# Patient Record
Sex: FEMALE | Race: ASIAN
[De-identification: names, ages, dates, MRNs, and addresses within clinical notes are randomized per-mention and may not be internally consistent; named-entity substitution may affect disease eponyms.]

---

## 2023-08-21 PROBLEM — Z00.00 ENCOUNTER FOR PREVENTIVE HEALTH EXAMINATION: Status: ACTIVE | Noted: 2023-08-21

## 2023-08-22 ENCOUNTER — APPOINTMENT (OUTPATIENT)
Dept: ORTHOPEDIC SURGERY | Facility: CLINIC | Age: 83
End: 2023-08-22
Payer: MEDICARE

## 2023-08-22 VITALS
SYSTOLIC BLOOD PRESSURE: 139 MMHG | WEIGHT: 125 LBS | BODY MASS INDEX: 20.09 KG/M2 | OXYGEN SATURATION: 98 % | HEIGHT: 66 IN | HEART RATE: 68 BPM | DIASTOLIC BLOOD PRESSURE: 77 MMHG

## 2023-08-22 PROCEDURE — 99204 OFFICE O/P NEW MOD 45 MIN: CPT

## 2023-08-22 RX ORDER — NAPROXEN 250 MG/1
250 TABLET ORAL
Refills: 0 | Status: ACTIVE | COMMUNITY

## 2023-08-22 RX ORDER — FERROUS GLUCONATE 256(28)MG
325 TABLET ORAL
Refills: 0 | Status: ACTIVE | COMMUNITY

## 2023-08-22 RX ORDER — ZOLPIDEM TARTRATE 5 MG/1
TABLET, FILM COATED ORAL
Refills: 0 | Status: ACTIVE | COMMUNITY

## 2023-08-22 RX ORDER — BACLOFEN 10 MG/1
10 TABLET ORAL
Refills: 0 | Status: ACTIVE | COMMUNITY

## 2023-08-22 RX ORDER — ALUMINUM HYDROXIDE AND MAGNESIUM CARBONATE 95; 358 MG/15ML; MG/15ML
LIQUID ORAL
Refills: 0 | Status: ACTIVE | COMMUNITY

## 2023-08-22 RX ORDER — ACYCLOVIR 800 MG/1
800 TABLET ORAL
Refills: 0 | Status: ACTIVE | COMMUNITY

## 2023-08-22 RX ORDER — DOCUSATE SODIUM AND SENNOSIDES 50; 8.6 MG/1; MG/1
8.6-5 TABLET, FILM COATED ORAL
Refills: 0 | Status: ACTIVE | COMMUNITY

## 2023-08-22 RX ORDER — FLUTICASONE PROPIONATE 50 UG/1
50 SPRAY, METERED NASAL
Refills: 0 | Status: ACTIVE | COMMUNITY

## 2023-08-22 RX ORDER — OMEPRAZOLE 40 MG/1
CAPSULE, DELAYED RELEASE ORAL
Refills: 0 | Status: ACTIVE | COMMUNITY

## 2023-08-22 RX ORDER — MULTIVIT-MIN/FOLIC/VIT K/LYCOP 400-300MCG
1000 TABLET ORAL
Refills: 0 | Status: ACTIVE | COMMUNITY

## 2023-08-22 RX ORDER — PANCRELIPASE 36000; 180000; 114000 [USP'U]/1; [USP'U]/1; [USP'U]/1
CAPSULE, DELAYED RELEASE PELLETS ORAL
Refills: 0 | Status: ACTIVE | COMMUNITY

## 2023-08-22 RX ORDER — PSYLLIUM HUSK 0.4 G
CAPSULE ORAL
Refills: 0 | Status: ACTIVE | COMMUNITY

## 2023-08-22 RX ORDER — CANDESARTAN CILEXETIL 16 MG/1
16 TABLET ORAL
Refills: 0 | Status: ACTIVE | COMMUNITY

## 2023-08-22 RX ORDER — MULTIVIT-MIN/IRON/FOLIC ACID/K 18-600-40
400 CAPSULE ORAL
Refills: 0 | Status: ACTIVE | COMMUNITY

## 2023-08-22 RX ORDER — LORATADINE 10 MG
TABLET,DISINTEGRATING ORAL
Refills: 0 | Status: ACTIVE | COMMUNITY

## 2023-08-22 RX ORDER — OXYBUTYNIN CHLORIDE 5 MG/1
5 TABLET ORAL
Refills: 0 | Status: ACTIVE | COMMUNITY

## 2023-08-22 NOTE — HISTORY OF PRESENT ILLNESS
[de-identified] : Initial Visiti 8/22/2023: Ms. Montenegro reports long-standing low back pain that radiates up during mid-back and then posteriorly down her right lower extremity to her foot. Symptoms most severe over the past 2 years. Pain is daily moderately bothersome, but worse with activities like heavy lifting. Patient's most recent exacerbation was in May of 2023 and occurred after several days of moving into her apartment. She takes oral anti-inflammatories with improvement of her symptoms. She's also been in physical therapy for the past 2 weeks as well, per her PCP, Dr. Irizarry. She denies any lower extremity, numbness, weakness, paresthesia. She is not limited in her ability to ambulate due to these symptoms.

## 2023-08-22 NOTE — END OF VISIT
[FreeTextEntry3] :   Documented by Shari Johnston acting as a scribe for Dr. Juliano Hair 8/22/23   All medical record entries made by the Scribe were at my, Dr. Juliano Hari, direction and personally dictated by me on 8/22/23. I have reviewed the chart and agree that the record accurately reflects my personal performance of the history, physical exam, assessment and plan. I have also personally directed, reviewed, and agreed with the chart.

## 2023-08-22 NOTE — PHYSICAL EXAM
[de-identified] : General: patient is well developed, well nourished, in no acute distress, alert and oriented x 3.   Mood and affect: normal   Respiratory: no respiratory distress noted   Skin: no scars over spine, skin intact, no erythema, increased warmth   Alignment: The spine is well compensated in the coronal and sagittal plane.    Gait: The patient is able to toe walk and heel walk without difficulty. The patient is able to tandem gait without difficulty.   Palpation: no tenderness to palpation spine or paraspinal region   Range of motion: Lumbar spine ROM is restricted    Neurologic Exam:   Motor: Manual Muscle testing in the lower extremities is 5 out of 5 in all muscle groups. There is no evidence of muscular atrophy in the lower extremities.  Sensory: Sensation to light touch is grossly intact in the lower extremities   Reflexes: DTR are present and symmetric throughout, no clonus, plantar responses are flexor   Hip Exam: No pain with internal or external rotation of hips bilaterally   Special tests: Straight leg raise test negative.  Cross straight leg test negative.  TORY test negative   Vascular: Examination of the peripheral vascular system demonstrates no evidence of congestion or edema. no evidence of lymphedema bilateral lower extremities, pulses are present and symmetric in both lower extremities. [de-identified] : Xray 8/22/23: mild scoliosis; severe disc degeneration L3/4, L4/5, L5/S1.  some arthritic changes seen in bilateral hips.  no instability or fractures.  normal sagittal alingment for age.  MRI reviewed

## 2023-08-22 NOTE — DISCUSSION/SUMMARY
[de-identified] : Lumbar stenosis; radiculopathy recommend Dr. Shine for ROXANN trial.  Spends half of her time in [place] will try to arrange for before she leaves for . follow up with me when she returns.

## 2023-09-08 ENCOUNTER — APPOINTMENT (OUTPATIENT)
Dept: PAIN MANAGEMENT | Facility: CLINIC | Age: 83
End: 2023-09-08
Payer: MEDICARE

## 2023-09-08 PROCEDURE — 99214 OFFICE O/P EST MOD 30 MIN: CPT | Mod: 25

## 2023-09-08 PROCEDURE — 62320 NJX INTERLAMINAR CRV/THRC: CPT

## 2023-09-08 PROCEDURE — 62322 NJX INTERLAMINAR LMBR/SAC: CPT

## 2023-09-10 NOTE — PHYSICAL EXAM
[Normal muscle bulk without asymmetry] : normal muscle bulk without asymmetry [Within functional limits and without pain] : within functional limits and without pain [Normal] : Gait: normal [Tandem Gait Normal] : tandem gait normal [UE/LE] : Sensory: Intact in bilateral upper & lower extremities [] : Sensory: [Spinous Process Tenderness] : no spinous process tenderness [Paraspinal Tenderness] : no paraspinal tenderness [Ataxic] : not ataxic [Antalgic] : not antalgic [de-identified] : Non-labored breathing [de-identified] : Positive facet loading on the right

## 2023-09-10 NOTE — DATA REVIEWED
[FreeTextEntry1] : Date of Exam: 08- EXAM:  MRI LUMBAR SPINE WITHOUT CONTRAST  - At L3-4, there is moderate to severe canal stenosis, moderate right, mild left foraminal narrowing, and moderate ligamentum flavum hypertrophy (5.5 mm on left and 6.3 mm on right) - At L4-5, there is moderate to severe canal stenosis, mild bilateral foraminal narrowing, and moderate ligamentum flavum hypertrophy (7.7 mm on left and 8.8 mm on right)  - Please refer to radiology report for full impression

## 2023-09-10 NOTE — HISTORY OF PRESENT ILLNESS
[Back Pain] : back pain [___ yrs] : [unfilled] year(s) ago [Episodic] : episodic [3] : a current pain level of 3/10 [0] : a minimum pain level of 0/10 [10] : a maximum pain level of 10/10 [Dull] : dull [Aching] : aching [Sitting] : sitting [Standing] : standing [Laying] : laying [Medications] : medications [Rest] : rest [Other: ___] : [unfilled] [FreeTextEntry1] : Patient is an 82 year old female with Medicare Part B referred by Dr. Hair (Orthopedics) for evaluation of chronic low back pain. Patient reports dealing with low back pain since her 20s but had a severe exacerbation in the summer of 2022 while carrying heavy luggage while on vacation. Patient had to use a cane to ambulate at that time secondary to pain. Patient describes her low back pain as dull and achy and occasionally radiates down the posterior aspect of her right lower extremity into her right foot. Pain is currently rated 2/10, rated 10/10 at its worst, and 0/10 at its best. Patient unable to identify an average pain score. Pain is alleviated by stretching, massage, rest, laying flat on back, and medications. Pain is exacerbated by prolonged sitting, standing, and most physical activities however patient notes that walking does not exacerbate her pain.  Recent imaging includes a lumbar MRI performed one month ago. Patient is currently participating in physical therapy without significant relief. She has failed a period of rest, activity modification, NSAIDs (Naproxen), muscle relaxants (Baclofen), chiropractic care (15 years ago). 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 Patient's pain is also impacting her quality of sleep and overall enjoyment of her activities.  Patient presents today to discuss further treatment options to help manage her pain.

## 2023-09-10 NOTE — REVIEW OF SYSTEMS
[Sight Problems] : vision problems [Back Pain] : back pain [Neck Pain] : neck pain [Joint Stiffness] : joint stiffness [Feeling Weak] : feeling weak [Negative] : Neurological [Decreased ROM] : no decreased range of motion

## 2023-09-10 NOTE — ASSESSMENT
[FreeTextEntry1] : Ms. Montenegro is an 82 year old female with chronic low back pain exacerbated in the summer of 2022 with recent MRI demonstrating spinal stenosis and ligamentum flavum hypertrophy especially at L3-L4 and L4-L5 who presents today to discuss further treatment options after failing conservative therapies. Patient may benefit from a Lumbar ROXANN today and discussed mild procedure with the patient as an option for the future as well. Of note, patient spends 4 months in [place] and 8 months in [place] and is scheduled to travel to  on 09/26/2023.  Plan: -Lumbar Interlaminar ROXANN at L3-L4 today -Patient provided reading material on mild procedure for future consideration given moderate ligamentum flavum hypertrophy at L3-L4 (5.5 mm on left and 6.3 mm on right) and L4-5 (7.7 mm on left and 8.8 mm on right)  -Continue Baclofen and Naproxen as prescribed -Continue Physical Therapy as prescribed by PCP  The potential benefits as well as rare but possible risks were reviewed with the patient.  These risks including infection including epidural abscess, meningitis, osteomyelitis, and discitis, bleeding including epidural hematoma, nerve injury, paralysis, failure to relieve pain or worse pain, headache, pneumothorax, elevated blood sugars, allergic reactions, adverse reactions to medications, vasovagal reactions, falls, etc. Following that discussion, we decided together that the potential benefits outweigh the potential risks and we decided to proceed with scheduling the procedure.  I answered all the patient's questions about the procedure including the technical details of the procedure and also offered that if any other questions arise we will also be happy to answer those on the day of the procedure. All questions today were answered to the patients satisfaction, and the patient stated their verbal understanding.    ================================= Procedure note:  Interlaminar lumbar epidural steroid injection L3-4  After obtaining consent, pre-procedure blood pressure and heart rate were stable and recorded in the nursing record. Standard monitors were applied. The patient was placed in the prone position. The lumbosacral area was widely prepped with chloroprep and draped in sterile fashion. Fluoroscopic guidance was used to identify the desired interlaminar space and for needle placement. Subcutaneous 0.5% lidocaine was used to anesthetize the skin overlying the target. A 20-gauge Tuohy needle was advanced to the epidural space using loss of resistance to air technique and AP / contralateral oblique views under fluoroscopy. There was no evidence of heme or CSF and no paresthesias were elicited with needle placement.   We did NOT use omnipaque contrast as patient expressed prior history of SOB when given contrast previously.  Next 3 ml preservative free normal saline  and 10 mg dexamethasone was administered epidurally with no pain elicited on injection. The needle was removed, skin cleansed with alcohol and a sterile bandage was applied. The patient tolerated the procedure well and no complications were encountered. Following the procedure, the patient's vital signs were stable. The patient was discharged home in good condition with post-procedural instructions.  Time Out: Immediately prior to the procedure, the following was verbally confirmed that there is a consent form and that the correct patient, planned procedure, site and side are consistent with documentation and that necessary equipment and/or blood products are available prior to the start of the case.  Complications: none EBL: <5 cc

## 2023-09-12 ENCOUNTER — EMERGENCY (EMERGENCY)
Facility: HOSPITAL | Age: 83
LOS: 1 days | Discharge: HOME | End: 2023-09-12
Admitting: STUDENT IN AN ORGANIZED HEALTH CARE EDUCATION/TRAINING PROGRAM
Payer: MEDICARE

## 2023-09-12 VITALS
HEART RATE: 67 BPM | WEIGHT: 125 LBS | SYSTOLIC BLOOD PRESSURE: 169 MMHG | DIASTOLIC BLOOD PRESSURE: 71 MMHG | RESPIRATION RATE: 18 BRPM | TEMPERATURE: 98 F | OXYGEN SATURATION: 98 %

## 2023-09-12 DIAGNOSIS — M25.521 PAIN IN RIGHT ELBOW: ICD-10-CM

## 2023-09-12 DIAGNOSIS — Z91.040 LATEX ALLERGY STATUS: ICD-10-CM

## 2023-09-12 DIAGNOSIS — Z87.448 PERSONAL HISTORY OF OTHER DISEASES OF URINARY SYSTEM: ICD-10-CM

## 2023-09-12 DIAGNOSIS — Z91.041 RADIOGRAPHIC DYE ALLERGY STATUS: ICD-10-CM

## 2023-09-12 DIAGNOSIS — M19.90 UNSPECIFIED OSTEOARTHRITIS, UNSPECIFIED SITE: ICD-10-CM

## 2023-09-12 DIAGNOSIS — S50.01XA CONTUSION OF RIGHT ELBOW, INITIAL ENCOUNTER: ICD-10-CM

## 2023-09-12 DIAGNOSIS — Z87.39 PERSONAL HISTORY OF OTHER DISEASES OF THE MUSCULOSKELETAL SYSTEM AND CONNECTIVE TISSUE: ICD-10-CM
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
## 2023-09-12 DIAGNOSIS — S00.83XA CONTUSION OF OTHER PART OF HEAD, INITIAL ENCOUNTER: ICD-10-CM

## 2023-09-12 DIAGNOSIS — I10 ESSENTIAL (PRIMARY) HYPERTENSION: ICD-10-CM

## 2023-09-12 PROCEDURE — G1004: CPT

## 2023-09-12 PROCEDURE — 99285 EMERGENCY DEPT VISIT HI MDM: CPT

## 2023-09-12 PROCEDURE — 70450 CT HEAD/BRAIN W/O DYE: CPT | Mod: MG

## 2023-09-12 PROCEDURE — 72125 CT NECK SPINE W/O DYE: CPT | Mod: 26,MG

## 2023-09-12 PROCEDURE — 72125 CT NECK SPINE W/O DYE: CPT | Mod: MG

## 2023-09-12 PROCEDURE — 70450 CT HEAD/BRAIN W/O DYE: CPT | Mod: 26,MG

## 2023-09-12 PROCEDURE — 73080 X-RAY EXAM OF ELBOW: CPT

## 2023-09-12 PROCEDURE — 99284 EMERGENCY DEPT VISIT MOD MDM: CPT | Mod: 25

## 2023-09-12 PROCEDURE — 73080 X-RAY EXAM OF ELBOW: CPT | Mod: 26,RT

## 2023-09-12 RX ADMIN — Medication 250 MILLIGRAM(S): at 19:50

## 2023-09-12 NOTE — ED PROVIDER NOTE - PROGRESS NOTE DETAILS
Rt elbow XR showing posterior fat pad and possible sail sign; suspicious for occult fx.  Sling applied; discussed with pt.  Advised follow up with orthopedist this week.

## 2023-09-12 NOTE — ED PROVIDER NOTE - NSFOLLOWUPINSTRUCTIONS_ED_ALL_ED_FT
Wear the sling, rest and elevate the arm.  Follow up with orthopedist this week (Dr Go or [facility] at [facility]).    Orthopedic Clinic: [address] [place] [address]th Floor  Call tomorrow [phone] to schedule.  Thursday afternoons 12-4 pm.    Return to the Emergency Department if you have any new or worsening symptoms, or if you have any concerns.  ================  How to Use a Sling    WHAT YOU NEED TO KNOW:    What is a sling? A sling is a strong fabric loop that hangs from your neck to support your arm. Your arm, bent at the elbow, rests in the sling. Some slings have a strap that goes down your back to take the weight off your neck. This strap is connected to the elbow side of the sling. Your healthcare provider will help you decide which sling is best for you and where you can get one. A sling helps prevent your hand, arm, and shoulder from moving so your injury can heal. A sling can also help if you have a heavy cast on your arm.    How do I use the sling? Your healthcare provider will teach you how to put on your sling. The following are general guidelines to help you remember what your provider teaches you:    Gently bend your injured arm and hold it in front of you, across your body. Your thumb should be pointing up.    Put your arm in the sling so your elbow is in the closed end. Your hand will be at the open end.    Put the edge of the sling so it covers the first fold of your little finger.    Put the strap around your neck. The strap usually has an adhesive fabric to make it easy for you to fasten. There should be a 2-finger space between the strap and your neck.    Wiggle your fingers as directed to prevent hand stiffness.  When should I call my doctor?    Your arm, hand, or fingers become swollen, numb, painful, or pale.    You begin to have neck or shoulder pain.    You have questions about how to use the sling.  CARE AGREEMENT:    You have the right to help plan your care. Learn about your health condition and how it may be treated. Discuss treatment options with your healthcare providers to decide what care you want to receive. You always have the right to refuse treatment.  ============  Elbow Fracture    WHAT YOU NEED TO KNOW:    What is an elbow fracture? An elbow fracture is a break in one or more of the 3 bones that form your elbow joint. Osteoporosis (brittle bones) can increase your risk for an elbow fracture.    What are the types of elbow fracture?    Nondisplaced means the bone cracked or broke but stayed in place.    Displaced means the 2 ends of the broken bone .    Comminuted means the bone cracked or broke into many pieces.    Open means the broken bone went through your skin.  What are the signs and symptoms of an elbow fracture?    Pain and tenderness    Swelling and bruising    Trouble moving your arm or not being able to move your arm at all    Weakness or numbness in your elbow, arm, or hand    Deformity (your arm is shaped differently than normal)  How is an elbow fracture diagnosed? Your healthcare provider will examine your injured elbow and arm. Your provider may check for areas where you have less feeling or problems moving your arm. You may need any of the following:    X-rays are used to check for broken bones.    A CT scan or MRI may show where the bone is broken and if other tissues are involved. You may be given contrast liquid to help healthcare providers see the bones better. Tell the healthcare provider if you have ever had an allergic reaction to contrast liquid. Do not enter the MRI room with anything metal. Metal can cause serious injury. Tell the healthcare provider if you have any metal in or on your body.  How is an elbow fracture treated?    Prescription pain medicine may be given. Ask your healthcare provider how to take this medicine safely. Some prescription pain medicines contain acetaminophen. Do not take other medicines that contain acetaminophen without talking to your healthcare provider. Too much acetaminophen may cause liver damage. Prescription pain medicine may cause constipation. Ask your healthcare provider how to prevent or treat constipation.    NSAIDs, such as ibuprofen, help decrease swelling, pain, and fever. This medicine is available with or without a doctor's order. NSAIDs can cause stomach bleeding or kidney problems in certain people. If you take blood thinner medicine, always ask your healthcare provider if NSAIDs are safe for you. Always read the medicine label and follow directions.    A device such as a brace, cast, sling, or splint may be put on your elbow to limit your arm movement. The device will hold the broken bones in place while they heal, help decrease pain, and prevent more damage.    Ultrasound therapy directs sound waves into your elbow. The sound waves help the bones heal.    Surgery may be needed to hold bones in their normal position with pins, wires, or screws. Surgery may also be done if you have other injuries, such as nerve or blood vessel damage.  What can I do to manage my symptoms?    Elevate your elbow above the level of your heart as often as you can. This will help decrease swelling and pain. Prop your elbow on pillows or blankets to keep it elevated comfortably. While your elbow is elevated, wiggle your fingers and open and close them to prevent hand stiffness.      Apply ice on your elbow on your elbow for 15 to 20 minutes every hour or as directed. Use an ice pack, or put crushed ice in a plastic bag. Cover it with a towel. Ice helps prevent tissue damage and decreases swelling and pain.    Go to physical therapy as directed. A physical therapist can teach you exercises to help improve movement and strength and to decrease pain.  When should I seek immediate care?    Your elbow, arm, or fingers are numb.    Your skin is swollen, cold, or pale.  When should I call my doctor?    You have a fever.    The pain gets worse, even after you rest and take your pain medicine.    You have new or more trouble moving your arm.    You have new sores around the area of your brace, splint, or cast.    Your brace, splint, or cast becomes damaged.    You have questions or concerns about your condition or care.  CARE AGREEMENT:    You have the right to help plan your care. Learn about your health condition and how it may be treated. Discuss treatment options with your healthcare providers to decide what care you want to receive. You always have the right to refuse treatment. Wear the sling, rest and elevate the arm.  Follow up with orthopedist this week (Dr Go or [facility] at [facility]).    Orthopedic Clinic: [address] [place] [address]th [address]  Call tomorrow [phone] to schedule.  Thursday afternoons 12-4 pm.    Return to the Emergency Department if you have any new or worsening symptoms, or if you have any concerns.  ================  How to Use a Sling    WHAT YOU NEED TO KNOW:    What is a sling? A sling is a strong fabric loop that hangs from your neck to support your arm. Your arm, bent at the elbow, rests in the sling. Some slings have a strap that goes down your back to take the weight off your neck. This strap is connected to the elbow side of the sling. Your healthcare provider will help you decide which sling is best for you and where you can get one. A sling helps prevent your hand, arm, and shoulder from moving so your injury can heal. A sling can also help if you have a heavy cast on your arm.    How do I use the sling? Your healthcare provider will teach you how to put on your sling. The following are general guidelines to help you remember what your provider teaches you:    Gently bend your injured arm and hold it in front of you, across your body. Your thumb should be pointing up.    Put your arm in the sling so your elbow is in the closed end. Your hand will be at the open end.    Put the edge of the sling so it covers the first fold of your little finger.    Put the strap around your neck. The strap usually has an adhesive fabric to make it easy for you to fasten. There should be a 2-finger space between the strap and your neck.    Wiggle your fingers as directed to prevent hand stiffness.  When should I call my doctor?    Your arm, hand, or fingers become swollen, numb, painful, or pale.    You begin to have neck or shoulder pain.    You have questions about how to use the sling.  CARE AGREEMENT:    You have the right to help plan your care. Learn about your health condition and how it may be treated. Discuss treatment options with your healthcare providers to decide what care you want to receive. You always have the right to refuse treatment.  ============  Elbow Fracture    WHAT YOU NEED TO KNOW:    What is an elbow fracture? An elbow fracture is a break in one or more of the 3 bones that form your elbow joint. Osteoporosis (brittle bones) can increase your risk for an elbow fracture.    What are the types of elbow fracture?    Nondisplaced means the bone cracked or broke but stayed in place.    Displaced means the 2 ends of the broken bone .    Comminuted means the bone cracked or broke into many pieces.    Open means the broken bone went through your skin.  What are the signs and symptoms of an elbow fracture?    Pain and tenderness    Swelling and bruising    Trouble moving your arm or not being able to move your arm at all    Weakness or numbness in your elbow, arm, or hand    Deformity (your arm is shaped differently than normal)  How is an elbow fracture diagnosed? Your healthcare provider will examine your injured elbow and arm. Your provider may check for areas where you have less feeling or problems moving your arm. You may need any of the following:    X-rays are used to check for broken bones.    A CT scan or MRI may show where the bone is broken and if other tissues are involved. You may be given contrast liquid to help healthcare providers see the bones better. Tell the healthcare provider if you have ever had an allergic reaction to contrast liquid. Do not enter the MRI room with anything metal. Metal can cause serious injury. Tell the healthcare provider if you have any metal in or on your body.  How is an elbow fracture treated?    Prescription pain medicine may be given. Ask your healthcare provider how to take this medicine safely. Some prescription pain medicines contain acetaminophen. Do not take other medicines that contain acetaminophen without talking to your healthcare provider. Too much acetaminophen may cause liver damage. Prescription pain medicine may cause constipation. Ask your healthcare provider how to prevent or treat constipation.    NSAIDs, such as ibuprofen, help decrease swelling, pain, and fever. This medicine is available with or without a doctor's order. NSAIDs can cause stomach bleeding or kidney problems in certain people. If you take blood thinner medicine, always ask your healthcare provider if NSAIDs are safe for you. Always read the medicine label and follow directions.    A device such as a brace, cast, sling, or splint may be put on your elbow to limit your arm movement. The device will hold the broken bones in place while they heal, help decrease pain, and prevent more damage.    Ultrasound therapy directs sound waves into your elbow. The sound waves help the bones heal.    Surgery may be needed to hold bones in their normal position with pins, wires, or screws. Surgery may also be done if you have other injuries, such as nerve or blood vessel damage.  What can I do to manage my symptoms?    Elevate your elbow above the level of your heart as often as you can. This will help decrease swelling and pain. Prop your elbow on pillows or blankets to keep it elevated comfortably. While your elbow is elevated, wiggle your fingers and open and close them to prevent hand stiffness.      Apply ice on your elbow on your elbow for 15 to 20 minutes every hour or as directed. Use an ice pack, or put crushed ice in a plastic bag. Cover it with a towel. Ice helps prevent tissue damage and decreases swelling and pain.    Go to physical therapy as directed. A physical therapist can teach you exercises to help improve movement and strength and to decrease pain.  When should I seek immediate care?    Your elbow, arm, or fingers are numb.    Your skin is swollen, cold, or pale.  When should I call my doctor?    You have a fever.    The pain gets worse, even after you rest and take your pain medicine.    You have new or more trouble moving your arm.    You have new sores around the area of your brace, splint, or cast.    Your brace, splint, or cast becomes damaged.    You have questions or concerns about your condition or care.  CARE AGREEMENT:    You have the right to help plan your care. Learn about your health condition and how it may be treated. Discuss treatment options with your healthcare providers to decide what care you want to receive. You always have the right to refuse treatment. Wear the sling, rest and elevate the arm.  Follow up with orthopedist this week (Dr Go or [facility] at [facility]).    Orthopedic Clinic: [address] [place] [address]  Call tomorrow [phone] to schedule.  Thursday afternoons 12-4 pm.    Return to the Emergency Department if you have any new or worsening symptoms, or if you have any concerns.  ================  How to Use a Sling    WHAT YOU NEED TO KNOW:    What is a sling? A sling is a strong fabric loop that hangs from your neck to support your arm. Your arm, bent at the elbow, rests in the sling. Some slings have a strap that goes down your back to take the weight off your neck. This strap is connected to the elbow side of the sling. Your healthcare provider will help you decide which sling is best for you and where you can get one. A sling helps prevent your hand, arm, and shoulder from moving so your injury can heal. A sling can also help if you have a heavy cast on your arm.    How do I use the sling? Your healthcare provider will teach you how to put on your sling. The following are general guidelines to help you remember what your provider teaches you:    Gently bend your injured arm and hold it in front of you, across your body. Your thumb should be pointing up.    Put your arm in the sling so your elbow is in the closed end. Your hand will be at the open end.    Put the edge of the sling so it covers the first fold of your little finger.    Put the strap around your neck. The strap usually has an adhesive fabric to make it easy for you to fasten. There should be a 2-finger space between the strap and your neck.    Wiggle your fingers as directed to prevent hand stiffness.  When should I call my doctor?    Your arm, hand, or fingers become swollen, numb, painful, or pale.    You begin to have neck or shoulder pain.    You have questions about how to use the sling.  CARE AGREEMENT:    You have the right to help plan your care. Learn about your health condition and how it may be treated. Discuss treatment options with your healthcare providers to decide what care you want to receive. You always have the right to refuse treatment.  ============  Elbow Fracture    WHAT YOU NEED TO KNOW:    What is an elbow fracture? An elbow fracture is a break in one or more of the 3 bones that form your elbow joint. Osteoporosis (brittle bones) can increase your risk for an elbow fracture.    What are the types of elbow fracture?    Nondisplaced means the bone cracked or broke but stayed in place.    Displaced means the 2 ends of the broken bone .    Comminuted means the bone cracked or broke into many pieces.    Open means the broken bone went through your skin.  What are the signs and symptoms of an elbow fracture?    Pain and tenderness    Swelling and bruising    Trouble moving your arm or not being able to move your arm at all    Weakness or numbness in your elbow, arm, or hand    Deformity (your arm is shaped differently than normal)  How is an elbow fracture diagnosed? Your healthcare provider will examine your injured elbow and arm. Your provider may check for areas where you have less feeling or problems moving your arm. You may need any of the following:    X-rays are used to check for broken bones.    A CT scan or MRI may show where the bone is broken and if other tissues are involved. You may be given contrast liquid to help healthcare providers see the bones better. Tell the healthcare provider if you have ever had an allergic reaction to contrast liquid. Do not enter the MRI room with anything metal. Metal can cause serious injury. Tell the healthcare provider if you have any metal in or on your body.  How is an elbow fracture treated?    Prescription pain medicine may be given. Ask your healthcare provider how to take this medicine safely. Some prescription pain medicines contain acetaminophen. Do not take other medicines that contain acetaminophen without talking to your healthcare provider. Too much acetaminophen may cause liver damage. Prescription pain medicine may cause constipation. Ask your healthcare provider how to prevent or treat constipation.    NSAIDs, such as ibuprofen, help decrease swelling, pain, and fever. This medicine is available with or without a doctor's order. NSAIDs can cause stomach bleeding or kidney problems in certain people. If you take blood thinner medicine, always ask your healthcare provider if NSAIDs are safe for you. Always read the medicine label and follow directions.    A device such as a brace, cast, sling, or splint may be put on your elbow to limit your arm movement. The device will hold the broken bones in place while they heal, help decrease pain, and prevent more damage.    Ultrasound therapy directs sound waves into your elbow. The sound waves help the bones heal.    Surgery may be needed to hold bones in their normal position with pins, wires, or screws. Surgery may also be done if you have other injuries, such as nerve or blood vessel damage.  What can I do to manage my symptoms?    Elevate your elbow above the level of your heart as often as you can. This will help decrease swelling and pain. Prop your elbow on pillows or blankets to keep it elevated comfortably. While your elbow is elevated, wiggle your fingers and open and close them to prevent hand stiffness.      Apply ice on your elbow on your elbow for 15 to 20 minutes every hour or as directed. Use an ice pack, or put crushed ice in a plastic bag. Cover it with a towel. Ice helps prevent tissue damage and decreases swelling and pain.    Go to physical therapy as directed. A physical therapist can teach you exercises to help improve movement and strength and to decrease pain.  When should I seek immediate care?    Your elbow, arm, or fingers are numb.    Your skin is swollen, cold, or pale.  When should I call my doctor?    You have a fever.    The pain gets worse, even after you rest and take your pain medicine.    You have new or more trouble moving your arm.    You have new sores around the area of your brace, splint, or cast.    Your brace, splint, or cast becomes damaged.    You have questions or concerns about your condition or care.  CARE AGREEMENT:    You have the right to help plan your care. Learn about your health condition and how it may be treated. Discuss treatment options with your healthcare providers to decide what care you want to receive. You always have the right to refuse treatment.

## 2023-09-12 NOTE — ED PROVIDER NOTE - CARE PROVIDERS DIRECT ADDRESSES
,[email or web address]_Unknown ,[email or web address],DirectAddress_Unknown ,[email or web address],DirectAddress_Unknown

## 2023-09-12 NOTE — ED PROVIDER NOTE - PHYSICAL EXAMINATION
GEN: WD/WN, NAD  HEAD: Nickel-sized contusion without swelling of left maxillary region; no periorbital ecchymosis or Harrell's sign  NEURO: Alert, oriented. CN II-XII intact. Clear speech.  SILT all ext. Motor 5/5 all ext.  Gait steady.   EYE: PERRL, EOMI.   ENT: Airway patent.  No dental injury. No epistaxis or rhinorrhea. No otorrhea or hemotympanum.  PULM: No resp distress. Lungs CTA bilat.  CV: RRR, S1S2  GI: Abdomen soft, nontender  MSK: Neck with FROM; no midline neck tenderness.  No tenderness down midline back. Extremities: ecchymosis and mild swelling over dorsoradial aspect left elbow, ROM 0-90.  Remainder of upper and lower ext joints nontender, no swelling, full ROM.    SKIN: Intact.  Normal color and turgor.

## 2023-09-12 NOTE — ED ADULT NURSE NOTE - NSFALLRISKINTERV_ED_ALL_ED
Communicate fall risk and risk factors to all staff, patient, and family/Provide visual cue: yellow wristband, yellow gown, etc/Reinforce activity limits and safety measures with patient and family/Call bell, personal items and telephone in reach/Instruct patient to call for assistance before getting out of bed/chair/stretcher/Non-slip footwear applied when patient is off stretcher/[place] to call system/Physically safe environment - no spills, clutter or unnecessary equipment/Purposeful Proactive Rounding/Room/bathroom lighting operational, light cord in reach Communicate fall risk and risk factors to all staff, patient, and family/Provide visual cue: yellow wristband, yellow gown, etc/Reinforce activity limits and safety measures with patient and family/Call bell, personal items and telephone in reach/Instruct patient to call for assistance before getting out of bed/chair/stretcher/Non-slip footwear applied when patient is off stretcher/[place] to call system/Physically safe environment - no spills, clutter or unnecessary equipment/Purposeful Proactive Rounding/Room/bathroom lighting operational, light cord in reach Communicate fall risk and risk factors to all staff, patient, and family/Provide visual cue: yellow wristband, yellow gown, etc/Reinforce activity limits and safety measures with patient and family/Call bell, personal items and telephone in reach/Instruct patient to call for assistance before getting out of bed/chair/stretcher/Non-slip footwear applied when patient is off stretcher/[place] to call system/Physically safe environment - no spills, clutter or unnecessary equipment/Purposeful Proactive Rounding/Room/bathroom lighting operational, light cord in reach

## 2023-09-12 NOTE — ED PROVIDER NOTE - CLINICAL SUMMARY MEDICAL DECISION MAKING FREE TEXT BOX
Mechanical fall on sidewalk: sustained contusion right elbow, NVI; will XR.  Small contusion left cheek, no LOC or other symptoms of ICH; given age (appears much younger than actual age) will obtain CT head and c-spine, though suspicion for ICH and cervical spine injuries are low.  No s/s to suggest thoracoabdominal injury.  Pt came to ED to have right elbow ecchymosis evaluated; NVI, will XR.

## 2023-09-12 NOTE — ED PROVIDER NOTE - CARE PROVIDER_API CALL
Gwen Go  Orthopaedic Surgery  [address]  [place] [address]  Phone: [phone]  Fax: [phone]  Follow Up Time: 1-3 Days    Daren Irizarry  Internal Medicine  Phone: [phone]  Fax: [phone]  Follow Up Time:    Gwen Go  Orthopaedic Surgery  [address]  [place] [address]  Phone: [phone]  Fax: [phone]  Follow Up Time: 1-3 Days    Daren Irizarry  Internal Medicine  Phone: [phone]  Fax: [phone]  Follow Up Time:    Gwen Go  Orthopaedic Surgery  [address]  [place] [address]  Phone: [phone]  Fax: [phone]  Follow Up Time: 1-3 Days    Daren Irizarry  Internal Medicine  Phone: [phone]  Fax: [phone]  Follow Up Time:

## 2023-09-12 NOTE — ED PROVIDER NOTE - PROVIDER TOKENS
PROVIDER:[TOKEN:[[id number]],FOLLOWUP:[1-3 Days]],PROVIDER:[TOKEN:[[id number]]] PROVIDER:[TOKEN:[[id number]],FOLLOWUP:[1-3 Days]],PROVIDER:[TOKEN:[[id number]]] PROVIDER:[TOKEN:[[id number]],FOLLOWUP:[1-3 Days]],PROVIDER:[TOKEN:[[id number]]]

## 2023-09-12 NOTE — ED PROVIDER NOTE - PATIENT PORTAL LINK FT
You can access the FollowMyHealth Patient Portal offered by [facility] by registering at the following website: [email or web address]. By joining [facility]’s FollowMyHealth portal, you will also be able to view your health information using other applications (apps) compatible with our system. You can access the FollowMyHealth Patient Portal offered by [facility] by registering at the following website: [email or web address]. By joining [employer]’s FollowMyHealth portal, you will also be able to view your health information using other applications (apps) compatible with our system. You can access the FollowMyHealth Patient Portal offered by [facility] by registering at the following website: [email or web address]. By joining [facility]’s FollowMyHealth portal, you will also be able to view your health information using other applications (apps) compatible with our system.

## 2023-09-12 NOTE — ED PROVIDER NOTE - OBJECTIVE STATEMENT
82 yo f PMHx cervical spine stenosis, GERD, OA, overactve bladder, HTN  - states caught toe on uneven sidewalk around 11 am today and tripped, striking her left cheek and right elbow on the ground.  Denies LOC/confusion/amnesia/seizure, and does not have a headache or dizziness.  No changes in vision, no focal weakness or numbness, no neck pain, no back pain, no associated CP or palpitations, no SOB, no abd pain.  Came to ED this evening because she gradually developed ecchymosis and pain to right elbow throughout the day.  No blood thinners. 84 yo f PMHx cervical spine stenosis, GERD, OA, overactve bladder, HTN  - states caught toe on uneven sidewalk around 11 am today and tripped, striking her left cheek and right elbow on the ground.  Denies LOC/confusion/amnesia/seizure, and does not have a headache or dizziness.  No changes in vision, no focal weakness or numbness, no neck pain, no back pain, no associated CP or palpitations, no SOB, no abd pain.  Came to ED this evening because she gradually developed ecchymosis and pain to right elbow throughout the day.  No blood thinners.

## 2023-09-12 NOTE — ED ADULT NURSE NOTE - OBJECTIVE STATEMENT
83 F / ambulates to ED  s/p tripped and fall at the sidewalk this morning endorsing right elbow pain and swelling, knee pain  .  patient denies HS/LOC/AC .. Noted small abrasion right palm . No obvious  open skin  noted .

## 2023-10-09 ENCOUNTER — APPOINTMENT (OUTPATIENT)
Dept: ORTHOPEDIC SURGERY | Facility: HOSPITAL | Age: 83
End: 2023-10-09

## 2023-10-11 ENCOUNTER — APPOINTMENT (OUTPATIENT)
Dept: ORTHOPEDIC SURGERY | Facility: CLINIC | Age: 83
End: 2023-10-11
Payer: MEDICARE

## 2023-10-11 PROCEDURE — 99213 OFFICE O/P EST LOW 20 MIN: CPT

## 2023-11-09 ENCOUNTER — APPOINTMENT (OUTPATIENT)
Dept: PAIN MANAGEMENT | Facility: CLINIC | Age: 83
End: 2023-11-09
Payer: MEDICARE

## 2023-11-09 VITALS — OXYGEN SATURATION: 98 % | DIASTOLIC BLOOD PRESSURE: 67 MMHG | SYSTOLIC BLOOD PRESSURE: 114 MMHG | HEART RATE: 81 BPM

## 2023-11-09 PROCEDURE — 62323 NJX INTERLAMINAR LMBR/SAC: CPT

## 2023-11-09 PROCEDURE — 99213 OFFICE O/P EST LOW 20 MIN: CPT | Mod: 25

## 2024-04-30 ENCOUNTER — APPOINTMENT (OUTPATIENT)
Dept: PAIN MANAGEMENT | Facility: CLINIC | Age: 84
End: 2024-04-30

## 2024-05-02 ENCOUNTER — APPOINTMENT (OUTPATIENT)
Dept: PAIN MANAGEMENT | Facility: CLINIC | Age: 84
End: 2024-05-02

## 2024-05-02 VITALS
DIASTOLIC BLOOD PRESSURE: 82 MMHG | HEART RATE: 78 BPM | OXYGEN SATURATION: 98 % | HEIGHT: 66 IN | SYSTOLIC BLOOD PRESSURE: 138 MMHG | WEIGHT: 125 LBS | BODY MASS INDEX: 20.09 KG/M2

## 2024-05-02 PROCEDURE — 62323 NJX INTERLAMINAR LMBR/SAC: CPT

## 2024-05-02 PROCEDURE — 99215 OFFICE O/P EST HI 40 MIN: CPT | Mod: 25

## 2024-05-02 NOTE — ASSESSMENT
[FreeTextEntry1] :    Plan: - L3-4 interlaminar epidural steroid injection today - redo MRI lumbar spine to evaluate for any changes since prior MRI - follow up in two weeks

## 2024-05-22 ENCOUNTER — APPOINTMENT (OUTPATIENT)
Dept: ORTHOPEDIC SURGERY | Facility: CLINIC | Age: 84
End: 2024-05-22
Payer: MEDICARE

## 2024-05-22 PROCEDURE — 99214 OFFICE O/P EST MOD 30 MIN: CPT

## 2024-05-28 ENCOUNTER — APPOINTMENT (OUTPATIENT)
Dept: ORTHOPEDIC SURGERY | Facility: CLINIC | Age: 84
End: 2024-05-28

## 2024-05-28 NOTE — HISTORY OF PRESENT ILLNESS
[de-identified] : Follow up 5/22/24: Patient returns for follow up. She underwent an L3/L4 interlaminar ROXANN with Dr. Shine on 5/2 with significant relief of her low back and bilateral lateral anterolateral thigh pain. She reports she is able to stand for longer periods of time. Overall, she has been getting less relief with ROXANN now than in the past (used to get about 5 months of relief, last injection gave about 2 months of relief). Also with chronic right knee pain, wearing brace. Sees rheumatology and orthopedics for her right knee. TKA not recommended at this time. No new neurologic symptoms. Completed, updated MRI lumbar spine, here for review.   Follow up 10/11/2023: Ms. Montenegro presents her follow-up. She had an epidural steroid injection with Dr. Shine and she reports significant relief of her low back and lower extremity pain. She's very happy with her current relief. She didn't have any new neurologic symptoms.  Initial Visiti 8/22/2023: Ms. Montenegro reports long-standing low back pain that radiates up during mid-back and then posteriorly down her right lower extremity to her foot. Symptoms most severe over the past 2 years. Pain is daily moderately bothersome, but worse with activities like heavy lifting. Patient's most recent exacerbation was in May of 2023 and occurred after several days of moving into her apartment. She takes oral anti-inflammatories with improvement of her symptoms. She's also been in physical therapy for the past 2 weeks as well, per her PCP, Dr. Irizarry. She denies any lower extremity, numbness, weakness, paresthesia. She is not limited in her ability to ambulate due to these symptoms.

## 2024-05-28 NOTE — DISCUSSION/SUMMARY
[de-identified] : Discussed my findings with the patient. Explained she will likely require surgical intervention in the future. Patient would like to continue to defer at this time. Order for physical therapy given. Patient states she may consider MILD procedure with Dr. Shine. Follow up with me as needed. All questions answered.

## 2024-05-28 NOTE — END OF VISIT
[FreeTextEntry3] :   This note was written by Daniella Gandhi PA-C, acting as a scribe for Dr. Juliano Hair. I, Dr. Juliano Hair, have read and attest that all the information, medical decision-making, and discharge instructions within are true and accurate.  I, Dr. Juliano Hair, personally performed the evaluation and management (E/M) services for this new patient. That E/M includes conducting the initial examination, assessing all conditions, and establishing the plan of care. Today, my ACP, Daniella Gandhi PA-C, was here to observe my evaluation and management services for this patient to be followed going forward. [Time Spent: ___ minutes] : I have spent [unfilled] minutes of time on the encounter.

## 2024-05-28 NOTE — PHYSICAL EXAM
[de-identified] : General: patient is well developed, well nourished, in no acute distress, alert and oriented x 3.  Mood and affect: normal  Respiratory: no respiratory distress noted  Skin: no scars over spine, skin intact, no erythema, increased warmth  Alignment: The spine is well compensated in the coronal and sagittal plane.  Gait: The patient is able to toe walk and heel walk without difficulty. The patient is able to tandem gait without difficulty.  Palpation: no tenderness to palpation spine or paraspinal region  Range of motion: Lumbar spine ROM is restricted  Neurologic Exam:  Motor: Manual Muscle testing in the lower extremities is 5 out of 5 in all muscle groups. There is no evidence of muscular atrophy in the lower extremities. Sensory: Sensation to light touch is grossly intact in the lower extremities  Reflexes: DTR are present and symmetric throughout, no clonus, plantar responses are flexor  Hip Exam: No pain with internal or external rotation of hips bilaterally  Special tests: Straight leg raise test negative. Cross straight leg test negative. TORY test negative  Vascular: Examination of the peripheral vascular system demonstrates no evidence of congestion or edema. no evidence of lymphedema bilateral lower extremities, pulses are present and symmetric in both lower extremities.   [de-identified] : MRI lumbar spine without contrast 5/7/24 (my read): L3/L4 and L4/L5 disc bulge and posterior element hypertrophy with moderate/severe central canal stenosis; L3/L4 severe central canal stenosis   Xray 8/22/23: mild scoliosis; severe disc degeneration L3/4, L4/5, L5/S1. some arthritic changes seen in bilateral hips. no instability or fractures. normal sagittal alingment for age.

## 2024-05-30 ENCOUNTER — APPOINTMENT (OUTPATIENT)
Dept: PAIN MANAGEMENT | Facility: CLINIC | Age: 84
End: 2024-05-30

## 2024-05-30 VITALS
OXYGEN SATURATION: 97 % | SYSTOLIC BLOOD PRESSURE: 129 MMHG | HEIGHT: 66 IN | DIASTOLIC BLOOD PRESSURE: 76 MMHG | HEART RATE: 77 BPM | BODY MASS INDEX: 20.09 KG/M2 | WEIGHT: 125 LBS

## 2024-05-30 DIAGNOSIS — M48.061 SPINAL STENOSIS, LUMBAR REGION WITHOUT NEUROGENIC CLAUDICATION: ICD-10-CM

## 2024-05-30 DIAGNOSIS — M41.50 OTHER SECONDARY SCOLIOSIS, SITE UNSPECIFIED: ICD-10-CM

## 2024-05-30 DIAGNOSIS — M24.28 DISORDER OF LIGAMENT, VERTEBRAE: ICD-10-CM

## 2024-05-30 PROCEDURE — 99215 OFFICE O/P EST HI 40 MIN: CPT

## 2024-05-30 NOTE — HISTORY OF PRESENT ILLNESS
[Back Pain] : back pain [Neck Pain] : neck pain [FreeTextEntry1] : 84F with chronic low back pain, bilateral lower extremity pain, bilateral knee pain, lumbar spinal stenosis and cervical stenosis initially referred from Dr. Hair for chronic low back pain   She was last seen 05/02/24 when she received L3-4 interlaminar epidural steroid injection. She reports that after her injection it took 2 weeks until she began to feel its effects.  She feels some soreness/ tightness in her low back with some intermittent shooting pain in her bilateral legs that extends to her knees but it does not extend to her feet (though she previously had symptoms in her foot). She notes that now she feels that she is able to walk more compared to prior to the injection.   She reports today that her knees bother her more than her back. She has seen an orthopedic surgeon and a rheumatologist - she is currently deciding whether she wishes to pursue knee replacement at this time.  She denies numbness/tingling, weakness, and bowel/bladder incontinence.  She is taking aleve, baclofen, voltaren gel She had tried gabapentin in the past but she had significant drowsiness  She had tried physical therapy previously which did not resolve her pain She has previously received lumbar ESIs in 11/23 and 05/24 She has previously received knee steroid injections and hyaluronic acid injections  MRI review reveals lumbar spinal stenosis:  L3-4 3.5 mm right side, 4.2 mm left side L4-5 7.1 mm right side, 7.7 mm left side Stable degenerative spine changes compared to prior MRI 2023

## 2024-05-30 NOTE — ASSESSMENT
[FreeTextEntry1] : 84F with chronic low back pain, bilateral lower extremity pain, bilateral knee pain, lumbar spinal stenosis and cervical stenosis, and ligamentum flavum hypertrophy. She has had good relief after interlaminar epidural steroid injections in 11/2023 and 05/2024.   - will pursue MILD procedure at the L3-4, and L4-5 - physical therapy referral

## 2024-06-21 ENCOUNTER — APPOINTMENT (OUTPATIENT)
Dept: PAIN MANAGEMENT | Facility: CLINIC | Age: 84
End: 2024-06-21

## 2024-07-23 ENCOUNTER — APPOINTMENT (OUTPATIENT)
Dept: PAIN MANAGEMENT | Facility: CLINIC | Age: 84
End: 2024-07-23

## 2024-07-23 VITALS
OXYGEN SATURATION: 92 % | DIASTOLIC BLOOD PRESSURE: 74 MMHG | WEIGHT: 125 LBS | SYSTOLIC BLOOD PRESSURE: 139 MMHG | BODY MASS INDEX: 20.09 KG/M2 | HEIGHT: 66 IN | HEART RATE: 66 BPM

## 2024-07-23 DIAGNOSIS — M24.28 DISORDER OF LIGAMENT, VERTEBRAE: ICD-10-CM

## 2024-07-23 DIAGNOSIS — M17.11 UNILATERAL PRIMARY OSTEOARTHRITIS, RIGHT KNEE: ICD-10-CM

## 2024-07-23 DIAGNOSIS — M48.061 SPINAL STENOSIS, LUMBAR REGION WITHOUT NEUROGENIC CLAUDICATION: ICD-10-CM

## 2024-07-23 DIAGNOSIS — M41.50 OTHER SECONDARY SCOLIOSIS, SITE UNSPECIFIED: ICD-10-CM

## 2024-07-23 PROCEDURE — 99215 OFFICE O/P EST HI 40 MIN: CPT | Mod: 25

## 2024-07-23 PROCEDURE — 20610 DRAIN/INJ JOINT/BURSA W/O US: CPT | Mod: RT

## 2024-07-23 RX ORDER — CETIRIZINE HCL 10 MG
10 TABLET ORAL
Refills: 0 | Status: ACTIVE | COMMUNITY

## 2024-07-23 NOTE — REVIEW OF SYSTEMS
[Back Pain] : back pain [Radiating Pain] : radiating pain [Negative] : Cardiovascular [Joint Pain] : joint pain [Joint Stiffness] : joint stiffness [Decreased ROM] : decreased range of motion

## 2024-07-26 NOTE — ASSESSMENT
[FreeTextEntry1] : 83 y/o female with chronic low back pain, bilateral lower extremity pain, bilateral knee pain, lumbar spinal stenosis and cervical stenosis initially referred from Dr. Hair, presenting for follow up after having MILD procedure at levels L3-L4 and L4-L5 on 6/24/24. Patient reports significant improvement in her low back pain and radicular pain, about 75%. She continues to feel some low back pain with radiculopathy to the posterior aspects of her thighs. She also has right knee tami to arthritis, with pain worse in the morning and with going down stairs. Patient had steroid injection a couple months ago which provided relief.  Plan: - Recommend Hyaluronic Acid gel injection series for right knee. Patient wishes to have first injection today. - Recommend LESI at L3-L4 for symptoms of lumbar radiculopathy - Follow up in 2 weeks for knee follow up. Will have LESI next visit and then will have 2nd of 3 series HA injections 2 weeks after LESI - Patient was given new physical therapy script - Follow up in 2 weeks   The potential benefits as well as rare but possible risks were reviewed with the patient.  These risks including infection including epidural abscess, meningitis, osteomyelitis, and discitis, bleeding including epidural hematoma, nerve injury, paralysis, failure to relieve pain or worse pain, headache, pneumothorax, elevated blood sugars, allergic reactions, adverse reactions to medications, vasovagal reactions, falls, etc. Following that discussion, we decided together that the potential benefits outweigh the potential risks and we decided to proceed with scheduling the procedure.  I answered all the patient's questions about the procedure including the technical details of the procedure and also offered that if any other questions arise we will also be happy to answer those on the day of the procedure. All questions today were answered to the patients satisfaction, and the patient stated their verbal understanding.    --------------------  Procedure Note:  Right Knee Injection under Fluoroscopy with Viscosupplmentation ARUNA: [id number] LOT: [id number] CAT No: [id number]  The potential benefits as well as rare but possible risks were reviewed with the patient. These risks including infection including infection, abscess, bleeding including hematoma, nerve injury, failure to relieve pain or worse pain, elevated blood sugars, allergic reactions, adverse reactions to medications, vasovagal reactions, falls, etc. Following that discussion, all questions were again answered to the patient's satisfaction, the patient stated their verbal understanding and written consent was obtained.  After obtaining consent, pre-procedure blood pressure and heart rate were stable and recorded in the nursing record. Standard monitors were applied. The patient was placed prone on the fluoroscopy table. The bilateral knees were prepped with chloroprep and draped in sterile fashion.  Using AP fluoroscopy, the appropriate landmarks were identified. The skin overlying the target was anesthetized with 1% Lidocaine. A 25-gauge 3.5 inch spinal needle was advanced under fluoroscopic guidance to the target. Aspiration was negative for heme, air, and CSF.  2 cc of a solution of 2 ml 0.25% Bupivacaine, Dexamethasone 10 mg and 2.5 cc Hyaluronic Acid (VISCO-3) of was injected intermittently and spread verified around the target.  The needle was removed, skin cleansed and a sterile bandage was applied. The patient tolerated the procedure well and no complications were encountered. Following the procedure, the patient's vital signs were stable. The patient was discharged home in good condition with post-procedural instructions.  Time Out: Immediately prior to the procedure, the following was verbally confirmed that there is a consent form and that the correct patient, planned procedure, site and side are consistent with documentation and that necessary equipment available prior to the start of the case.  Complications: none EBL: <5 cc

## 2024-07-26 NOTE — ASSESSMENT
[FreeTextEntry1] : 85 y/o female with chronic low back pain, bilateral lower extremity pain, bilateral knee pain, lumbar spinal stenosis and cervical stenosis initially referred from Dr. Hair, presenting for follow up after having MILD procedure at levels L3-L4 and L4-L5 on 6/24/24. Patient reports significant improvement in her low back pain and radicular pain, about 75%. She continues to feel some low back pain with radiculopathy to the posterior aspects of her thighs. She also has right knee tami to arthritis, with pain worse in the morning and with going down stairs. Patient had steroid injection a couple months ago which provided relief.  Plan: - Recommend Hyaluronic Acid gel injection series for right knee. Patient wishes to have first injection today. - Recommend LESI at L3-L4 for symptoms of lumbar radiculopathy - Follow up in 2 weeks for knee follow up. Will have LESI next visit and then will have 2nd of 3 series HA injections 2 weeks after LESI - Patient was given new physical therapy script - Follow up in 2 weeks   The potential benefits as well as rare but possible risks were reviewed with the patient.  These risks including infection including epidural abscess, meningitis, osteomyelitis, and discitis, bleeding including epidural hematoma, nerve injury, paralysis, failure to relieve pain or worse pain, headache, pneumothorax, elevated blood sugars, allergic reactions, adverse reactions to medications, vasovagal reactions, falls, etc. Following that discussion, we decided together that the potential benefits outweigh the potential risks and we decided to proceed with scheduling the procedure.  I answered all the patient's questions about the procedure including the technical details of the procedure and also offered that if any other questions arise we will also be happy to answer those on the day of the procedure. All questions today were answered to the patients satisfaction, and the patient stated their verbal understanding.    --------------------  Procedure Note:  Right Knee Injection under Fluoroscopy with Viscosupplmentation ARUNA: [id number] LOT: [id number] CAT No: [id number]  The potential benefits as well as rare but possible risks were reviewed with the patient. These risks including infection including infection, abscess, bleeding including hematoma, nerve injury, failure to relieve pain or worse pain, elevated blood sugars, allergic reactions, adverse reactions to medications, vasovagal reactions, falls, etc. Following that discussion, all questions were again answered to the patient's satisfaction, the patient stated their verbal understanding and written consent was obtained.  After obtaining consent, pre-procedure blood pressure and heart rate were stable and recorded in the nursing record. Standard monitors were applied. The patient was placed prone on the fluoroscopy table. The bilateral knees were prepped with chloroprep and draped in sterile fashion.  Using AP fluoroscopy, the appropriate landmarks were identified. The skin overlying the target was anesthetized with 1% Lidocaine. A 25-gauge 3.5 inch spinal needle was advanced under fluoroscopic guidance to the target. Aspiration was negative for heme, air, and CSF.  2 cc of a solution of 2 ml 0.25% Bupivacaine, Dexamethasone 10 mg and 2.5 cc Hyaluronic Acid (VISCO-3) of was injected intermittently and spread verified around the target.  The needle was removed, skin cleansed and a sterile bandage was applied. The patient tolerated the procedure well and no complications were encountered. Following the procedure, the patient's vital signs were stable. The patient was discharged home in good condition with post-procedural instructions.  Time Out: Immediately prior to the procedure, the following was verbally confirmed that there is a consent form and that the correct patient, planned procedure, site and side are consistent with documentation and that necessary equipment available prior to the start of the case.  Complications: none EBL: <5 cc

## 2024-07-26 NOTE — HISTORY OF PRESENT ILLNESS
[Back Pain] : back pain [Episodic] : episodic [Aching] : aching [Shooting] : shooting [FreeTextEntry1] : Patient is an 85 y/o female with chronic low back pain, bilateral lower extremity pain, bilateral knee pain, lumbar spinal stenosis and cervical stenosis initially referred from Dr. Hair, presenting for follow up after having MILD procedure at levels L3-L4 and L4-L5 on 6/24/24. Patient reports significant improvement in her low back pain and radicular pain, about 75%. She reports she is able to stand for a longer period of time, as well as stand for longer. She still reports some pain when standing for prolonged periods, although it is not as painful as prior to the procedure. That pain is felt on the posterior aspect of her bilateral thighs. She had some pain on 7/18/24 after taking a workshop course where she was very active, but it has since resolved.   She also complains of right knee pain. The pain is felt on the medial aspect of her right knee. The pain is worse in the morning when she wakes up, and when she goes down the stairs. She has gotten a steroid injection in the joint a couple months ago and reports some improvement but the pain is still bothersome.   She takes Aleve for pain, heating pads, and uses Voltaren gel on her right knee for some relief. She is currently attending physical therapy sessions and would like an updated prescription.

## 2024-07-26 NOTE — PHYSICAL EXAM
[Normal muscle bulk without asymmetry] : normal muscle bulk without asymmetry [Normal Spine curvature] : normal spine curvature [Spinous Process Tenderness] : spinous process tenderness [Facet Tenderness] : facet tenderness [Normal] : Gait: normal [SLR] : positive straight leg raise [LE] : Sensory: Intact in bilateral lower extremities [Ataxic] : not ataxic [Antalgic] : not antalgic [de-identified] : within limits  [de-identified] : slightly forward flexed posture with walking

## 2024-07-26 NOTE — ASSESSMENT
[FreeTextEntry1] : 85 y/o female with chronic low back pain, bilateral lower extremity pain, bilateral knee pain, lumbar spinal stenosis and cervical stenosis initially referred from Dr. Hair, presenting for follow up after having MILD procedure at levels L3-L4 and L4-L5 on 6/24/24. Patient reports significant improvement in her low back pain and radicular pain, about 75%. She continues to feel some low back pain with radiculopathy to the posterior aspects of her thighs. She also has right knee tami to arthritis, with pain worse in the morning and with going down stairs. Patient had steroid injection a couple months ago which provided relief.  Plan: - Recommend Hyaluronic Acid gel injection series for right knee. Patient wishes to have first injection today. - Recommend LESI at L3-L4 for symptoms of lumbar radiculopathy - Follow up in 2 weeks for knee follow up. Will have LESI next visit and then will have 2nd of 3 series HA injections 2 weeks after LESI - Patient was given new physical therapy script - Follow up in 2 weeks   The potential benefits as well as rare but possible risks were reviewed with the patient.  These risks including infection including epidural abscess, meningitis, osteomyelitis, and discitis, bleeding including epidural hematoma, nerve injury, paralysis, failure to relieve pain or worse pain, headache, pneumothorax, elevated blood sugars, allergic reactions, adverse reactions to medications, vasovagal reactions, falls, etc. Following that discussion, we decided together that the potential benefits outweigh the potential risks and we decided to proceed with scheduling the procedure.  I answered all the patient's questions about the procedure including the technical details of the procedure and also offered that if any other questions arise we will also be happy to answer those on the day of the procedure. All questions today were answered to the patients satisfaction, and the patient stated their verbal understanding.    --------------------  Procedure Note:  Right Knee Injection under Fluoroscopy with Viscosupplmentation ARUNA: [id number] LOT: [id number] CAT No: [id number]  The potential benefits as well as rare but possible risks were reviewed with the patient. These risks including infection including infection, abscess, bleeding including hematoma, nerve injury, failure to relieve pain or worse pain, elevated blood sugars, allergic reactions, adverse reactions to medications, vasovagal reactions, falls, etc. Following that discussion, all questions were again answered to the patient's satisfaction, the patient stated their verbal understanding and written consent was obtained.  After obtaining consent, pre-procedure blood pressure and heart rate were stable and recorded in the nursing record. Standard monitors were applied. The patient was placed prone on the fluoroscopy table. The bilateral knees were prepped with chloroprep and draped in sterile fashion.  Using AP fluoroscopy, the appropriate landmarks were identified. The skin overlying the target was anesthetized with 1% Lidocaine. A 25-gauge 3.5 inch spinal needle was advanced under fluoroscopic guidance to the target. Aspiration was negative for heme, air, and CSF.  2 cc of a solution of 2 ml 0.25% Bupivacaine, Dexamethasone 10 mg and 2.5 cc Hyaluronic Acid (VISCO-3) of was injected intermittently and spread verified around the target.  The needle was removed, skin cleansed and a sterile bandage was applied. The patient tolerated the procedure well and no complications were encountered. Following the procedure, the patient's vital signs were stable. The patient was discharged home in good condition with post-procedural instructions.  Time Out: Immediately prior to the procedure, the following was verbally confirmed that there is a consent form and that the correct patient, planned procedure, site and side are consistent with documentation and that necessary equipment available prior to the start of the case.  Complications: none EBL: <5 cc

## 2024-07-26 NOTE — HISTORY OF PRESENT ILLNESS
[Back Pain] : back pain [Episodic] : episodic [Aching] : aching [Shooting] : shooting [FreeTextEntry1] : Patient is an 83 y/o female with chronic low back pain, bilateral lower extremity pain, bilateral knee pain, lumbar spinal stenosis and cervical stenosis initially referred from Dr. Hair, presenting for follow up after having MILD procedure at levels L3-L4 and L4-L5 on 6/24/24. Patient reports significant improvement in her low back pain and radicular pain, about 75%. She reports she is able to stand for a longer period of time, as well as stand for longer. She still reports some pain when standing for prolonged periods, although it is not as painful as prior to the procedure. That pain is felt on the posterior aspect of her bilateral thighs. She had some pain on 7/18/24 after taking a workshop course where she was very active, but it has since resolved.   She also complains of right knee pain. The pain is felt on the medial aspect of her right knee. The pain is worse in the morning when she wakes up, and when she goes down the stairs. She has gotten a steroid injection in the joint a couple months ago and reports some improvement but the pain is still bothersome.   She takes Aleve for pain, heating pads, and uses Voltaren gel on her right knee for some relief. She is currently attending physical therapy sessions and would like an updated prescription.

## 2024-07-26 NOTE — PHYSICAL EXAM
[Normal muscle bulk without asymmetry] : normal muscle bulk without asymmetry [Normal Spine curvature] : normal spine curvature [Spinous Process Tenderness] : spinous process tenderness [Facet Tenderness] : facet tenderness [Normal] : Gait: normal [SLR] : positive straight leg raise [LE] : Sensory: Intact in bilateral lower extremities [Ataxic] : not ataxic [Antalgic] : not antalgic [de-identified] : within limits  [de-identified] : slightly forward flexed posture with walking

## 2024-07-26 NOTE — PHYSICAL EXAM
[Normal muscle bulk without asymmetry] : normal muscle bulk without asymmetry [Normal Spine curvature] : normal spine curvature [Spinous Process Tenderness] : spinous process tenderness [Facet Tenderness] : facet tenderness [Normal] : Gait: normal [SLR] : positive straight leg raise [LE] : Sensory: Intact in bilateral lower extremities [Ataxic] : not ataxic [Antalgic] : not antalgic [de-identified] : within limits  [de-identified] : slightly forward flexed posture with walking

## 2024-08-13 ENCOUNTER — APPOINTMENT (OUTPATIENT)
Dept: PAIN MANAGEMENT | Facility: CLINIC | Age: 84
End: 2024-08-13

## 2024-08-13 VITALS
WEIGHT: 125 LBS | SYSTOLIC BLOOD PRESSURE: 105 MMHG | BODY MASS INDEX: 20.09 KG/M2 | OXYGEN SATURATION: 97 % | DIASTOLIC BLOOD PRESSURE: 66 MMHG | HEIGHT: 66 IN | HEART RATE: 83 BPM

## 2024-08-13 DIAGNOSIS — M48.02 SPINAL STENOSIS, CERVICAL REGION: ICD-10-CM

## 2024-08-13 DIAGNOSIS — M24.28 DISORDER OF LIGAMENT, VERTEBRAE: ICD-10-CM

## 2024-08-13 DIAGNOSIS — M54.12 SPINAL STENOSIS, CERVICAL REGION: ICD-10-CM

## 2024-08-13 DIAGNOSIS — M48.061 SPINAL STENOSIS, LUMBAR REGION WITHOUT NEUROGENIC CLAUDICATION: ICD-10-CM

## 2024-08-13 DIAGNOSIS — M25.511 PAIN IN RIGHT SHOULDER: ICD-10-CM

## 2024-08-13 PROCEDURE — 20611 DRAIN/INJ JOINT/BURSA W/US: CPT | Mod: RT

## 2024-08-13 PROCEDURE — 20610 DRAIN/INJ JOINT/BURSA W/O US: CPT

## 2024-08-13 PROCEDURE — 99215 OFFICE O/P EST HI 40 MIN: CPT | Mod: 25

## 2024-08-13 PROCEDURE — 77002 NEEDLE LOCALIZATION BY XRAY: CPT | Mod: 59

## 2024-08-13 RX ORDER — CELECOXIB 100 MG/1
100 CAPSULE ORAL TWICE DAILY
Qty: 60 | Refills: 0 | Status: ACTIVE | COMMUNITY
Start: 2024-08-13 | End: 1900-01-01

## 2024-08-18 NOTE — REVIEW OF SYSTEMS
[Radiating Pain] : radiating pain [Negative] : Constitutional [Back Pain] : no back pain [Muscle Pain] : no muscle pain [Numbness] : no numbness [FreeTextEntry9] : occasional pain to bilateral posterior thighs. Pain in right shoulder with head flexion, pain between shoulder blades

## 2024-08-18 NOTE — HISTORY OF PRESENT ILLNESS
[_______] : [unfilled] [___ mths] : [unfilled] month(s) ago [Paroxysmal] : paroxysmal [2] : a maximum pain level of 2/10 [Dull] : dull [Aching] : aching [Bending] : bending [Turning Head] : turning head [Medications] : medications [FreeTextEntry1] : Patient is an 84F pmh low back pain secondary to lumbar stenosis s/p MILD b/l  L3-4 and L4-5 on 6/24/24, chronic right knee pain s/p hyaluronic acid injection on 7/23, and a history of cervical stenosis for which she takes baclofen.  8/13: - Patient notes that the MILD procedure was a "tremendous success", she no longer has low back pain and her right knee pain is about 90% improved as well - She still gets pain in the medial aspect of her right knee/ top of the right calf, especially after walking 30 minutes or cycling - At her last visit the plan had been for a L3-4 lumbar epidural steroid injection for her pain to her bilateral posterior thighs               - She states the pain to her posterior thighs lasts about 5 seconds, has occurred 2-3x since her last appointment and she cannot deduce what brings it on               - She states that she does not feel she needs the lumbar epidural injection today - She states today that the pain bothering her the most is new pain between her shoulder blades and into her shoulders, R>L               - She reports that the pain occurs when she is leaning forward and using her arms, 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

## 2024-08-18 NOTE — PHYSICAL EXAM
[Normal muscle bulk without asymmetry] : normal muscle bulk without asymmetry [UE/LE] : Sensory: Intact in bilateral upper & lower extremities [] : Sensory: [ALL] : Biceps, brachioradialis, triceps, patellar, and achilles 2+ and symmetric bilaterally [Normal] : Skin color, texture, turgor normal, no rashes or lesions in the four extremities and back [Spinous Process Tenderness] : no spinous process tenderness [Facet Tenderness] : no facet tenderness [de-identified] : pain with turning head to the right, pain with neck flexion

## 2024-08-18 NOTE — ASSESSMENT
[FreeTextEntry1] : Patient is an 84F pmh low back pain secondary to lumbar stenosis s/p MILD b/l L3-4 and L4-5 on 6/24/24, chronic right knee pain s/p hyaluronic acid injection on 7/23, and a history of cervical stenosis for which she takes baclofen. Discussed with patient that we will hold off on lumbar epidural steroid injection today as her posterior thigh pain does not seem to be bothersome to her. Instead, we will plan on doing the 2nd right knee hyaluronic acid shot of 3. Additionally, we will plan on getting a cervical spine MRI for her new right shoulder and shoulder blade pain. We will also start her on celebrex BID for this new right shoulder pain. Patient expressed understanding of this plan and all questions were answered.  Plan: - 2nd right knee hyaluronic acid injection today - Script given for cervical spine MRI - Prescription sent for celebrex 100mg po bid - Patient to call for follow up appointment after cervical MRI is obtained  Right Knee Injection under Ultrasound with Viscosupplementation   The potential benefits as well as rare but possible risks were reviewed with the patient.  These risks including infection including infection, abscess, bleeding including hematoma, nerve injury, failure to relieve pain or worse pain, elevated blood sugars, allergic reactions, adverse reactions to medications, vasovagal reactions, falls, etc. Following that discussion, all questions were again answered to the patient's satisfaction, the patient stated their verbal understanding and written consent was obtained.    After obtaining consent, pre-procedure blood pressure and heart rate were stable and recorded in the nursing record. Standard monitors were applied. The patient was placed prone on the table. The bilateral knees were prepped with chloroprep and draped in sterile fashion.   Using AP fluoroscopy, the appropriate landmarks were identified. The skin overlying the target was anesthetized with 1% Lidocaine. A 25-gauge 3.5 inch spinal needle was advanced under ultrasound guidance to the target. Aspiration was negative for heme, air.    3 cc of a solution of 2 ml 0.25% Bupivacaine, Dexamethasone 10 mg and 2.5 cc Hyaluronic Acid (VISCO-3) of  was injected intermittently and spread verified around the target.   The needle was removed, skin cleansed and a sterile bandage was applied. The patient tolerated the procedure well and no complications were encountered. Following the procedure, the patient's vital signs were stable. The patient was discharged home in good condition with post-procedural instructions.   Time Out: Immediately prior to the procedure, the following was verbally confirmed that there is a consent form and that the correct patient, planned procedure, site and side are consistent with documentation and that necessary equipment available prior to the start of the case.   The procedure was repeated on the contralateral side.   Complications: none EBL: <5 cc

## 2024-08-18 NOTE — PHYSICAL EXAM
[Normal muscle bulk without asymmetry] : normal muscle bulk without asymmetry [UE/LE] : Sensory: Intact in bilateral upper & lower extremities [] : Sensory: [ALL] : Biceps, brachioradialis, triceps, patellar, and achilles 2+ and symmetric bilaterally [Normal] : Skin color, texture, turgor normal, no rashes or lesions in the four extremities and back [Spinous Process Tenderness] : no spinous process tenderness [Facet Tenderness] : no facet tenderness [de-identified] : pain with turning head to the right, pain with neck flexion

## 2024-08-29 ENCOUNTER — APPOINTMENT (OUTPATIENT)
Dept: PAIN MANAGEMENT | Facility: CLINIC | Age: 84
End: 2024-08-29
Payer: MEDICARE

## 2024-08-29 VITALS
BODY MASS INDEX: 20.09 KG/M2 | DIASTOLIC BLOOD PRESSURE: 70 MMHG | WEIGHT: 125 LBS | HEART RATE: 77 BPM | HEIGHT: 66 IN | SYSTOLIC BLOOD PRESSURE: 128 MMHG

## 2024-08-29 DIAGNOSIS — M48.061 SPINAL STENOSIS, LUMBAR REGION WITHOUT NEUROGENIC CLAUDICATION: ICD-10-CM

## 2024-08-29 DIAGNOSIS — M24.28 DISORDER OF LIGAMENT, VERTEBRAE: ICD-10-CM

## 2024-08-29 DIAGNOSIS — M48.02 SPINAL STENOSIS, CERVICAL REGION: ICD-10-CM

## 2024-08-29 DIAGNOSIS — M54.12 SPINAL STENOSIS, CERVICAL REGION: ICD-10-CM

## 2024-08-29 PROCEDURE — 99215 OFFICE O/P EST HI 40 MIN: CPT

## 2024-08-31 NOTE — HISTORY OF PRESENT ILLNESS
[Back Pain] : back pain [Neck Pain] : neck pain [FreeTextEntry1] : Patient is an 84F pmh low back pain secondary to lumbar stenosis s/p MILD b/l L3-4 and L4-5 on 6/24/24, chronic right knee pain s/p hyaluronic acid injection on 7/23/24 and second one on 8/13/24, and a history of cervical stenosis for which she takes baclofen. She presents today for follow up after 2nd knee injection and for pain in neck and shoulders.   Patient reports right knee pain has improved, although she now feels pain in the central area of her knee while the pain usually was on the medial aspect. She finds that if she walks more than 5k steps, her knee pain is exacerbated.   She complains of b/l neck and shoulder pain. She has a history of neck pain x 10-12 years, where she had symptoms of cervical radiculopathy. She reports her neck pain has been worsening recently. The pain is felt mostly across her upper back and shoulder blades. The pain is worse when she turns her head and especially when she stands for a prolonged period of time. She has been avoiding standing. She takes baclofen for pain relief. She is currently in physical therapy.   She admits to balance issues, no falls, although she must hold on to railings and has felt as if she will fall on multiple occasions. She denies bowel or bladder incontinence.   She also reports low back pain across her low back, only felt when she awakens in the morning. The pain resolves after being up and walking a little.

## 2024-08-31 NOTE — REVIEW OF SYSTEMS
[Back Pain] : back pain [Neck Pain] : neck pain [Joint Pain] : joint pain [Decreased ROM] : decreased range of motion [Negative] : Cardiovascular [Fecal Incontinence] : no fecal incontinence [Incontinence] : no incontinence

## 2024-08-31 NOTE — ASSESSMENT
[FreeTextEntry1] : 84F pmh low back pain secondary to lumbar stenosis s/p MILD b/l L3-4 and L4-5 on 6/24/24, chronic right knee pain s/p hyaluronic acid injection on 7/23/24 and second one on 8/13/24, and a history of cervical stenosis for which she takes baclofen. She presents today for follow up after 2nd knee injection and for pain in neck, shoulders, right knee, and back. Patient reports some improvement with 2/3 hyaluronic knee injections, although now she is feeling pain in the center of her knee versus the medial side. She is unable to walk >5k steps without getting exacerbation. She continues to have symptoms of cervical radiculopathy with accompanied symptoms of balance issues and positive joaquin's sign. She has new low back pain worse with awakening.   Plan: - Recommend Cervical Epidural Steroid Injection C7. Patient to heal from stye in left eye and complete antibiotic eye drop course  - Recommend PNS for right saphenous nerve SPR. Patient wishes to proceed. she signed consent form. Medical clearance form given, and date scheduled for 9/9/24 - Follow up for procedure   The potential benefits as well as rare but possible risks were reviewed with the patient.  These risks including infection including epidural abscess, meningitis, osteomyelitis, and discitis, bleeding including epidural hematoma, nerve injury, paralysis, failure to relieve pain or worse pain, headache, pneumothorax, elevated blood sugars, allergic reactions, adverse reactions to medications, vasovagal reactions, falls, etc. Following that discussion, we decided together that the potential benefits outweigh the potential risks and we decided to proceed with scheduling the procedure.  I answered all the patient's questions about the procedure including the technical details of the procedure and also offered that if any other questions arise we will also be happy to answer those on the day of the procedure. All questions today were answered to the patients satisfaction, and the patient stated their verbal understanding.

## 2024-08-31 NOTE — PHYSICAL EXAM
[Normal muscle bulk without asymmetry] : normal muscle bulk without asymmetry [Normal Spine curvature] : normal spine curvature [Spinous Process Tenderness] : spinous process tenderness [Facet Tenderness] : facet tenderness [Paraspinal Tenderness] : paraspinal tenderness [Normal] : Gait: normal [Spurling] : positive Spurling's Test [Michele's Sign] : positive Michele's Sign [SLR] : positive straight leg raise [LE] : Sensory: Intact in bilateral lower extremities [Bicep] : biceps 2+ and symmetric bilaterally [B.R.] : Brachioradialis 2+ and symmetric bilaterally [Ataxic] : not ataxic [Antalgic] : not antalgic [de-identified] : stye in right lower eyelid  [de-identified] : cervical spine ROM limited on lateral rotation   [de-identified] : slightly forward flexed posture with walking

## 2024-08-31 NOTE — DATA REVIEWED
[FreeTextEntry1] :  MRI CERVICAL SPINE WITHOUT CONTRAST 8/15/2024   HISTORY:  Chronic neck and right shoulder pain.   TECHNIQUE:  Multiplanar, multi-sequential MRI of the cervical spine was obtained on a 1.5T scanner using a standard protocol.  COMPARISON:  None   FINDINGS:   OSSEOUS STRUCTURES: Vertebral body heights are preserved. No marrow edema or destructive marrow infiltrative process.  ALIGNMENT: Reversal of the normal cervical curvature. Grade 1 anterolisthesis at C3-4, C4-5 and C7-T1. Retrolisthesis at C5-6 and C6-7.   SPINAL CORD: Normal signal.  POSTERIOR FOSSA/CERVICOMEDULLARY JUNCTION: Normal.  NECK/PARASPINAL SOFT TISSUES: Unremarkable.  INCLUDED THORACIC SPINE: Unremarkable.  DISCS: Disc desiccation at every level in the cervical spine. Disc space narrowing advanced at C5-6 and C6-7.  Vacuum changes in the superior endplate C6 The following axial levels are imaged and detailed below:  C2-C3: No disc bulging or herniation. No spinal canal or foraminal stenosis.  C3-C4: Small annular fissure and disc bulge abutting the thecal sac. No central canal stenosis. Moderate bilateral neural foraminal stenosis secondary to facet hypertrophy.   C4-C5: Grade 1 anterolisthesis and a disc bulge/osteophyte flattening the cord. No cord signal abnormality. Mild central canal stenosis, severe bilateral neural foraminal stenosis, right greater than left, secondary to facet hypertrophy.   C5-C6: Retrolisthesis and a central disc protrusion/osteophyte flattening the cord. No cord signal abnormality. Moderate central canal stenosis, severe bilateral neural foraminal stenosis.   C6-C7: Retrolisthesis and a central disc protrusion/osteophyte flattening the cord. No cord signal abnormality. Mild/moderate central canal stenosis and severe bilateral neural foraminal stenosis.   C7-T1: Grade 1 anterolisthesis. Minimal disc bulging. No foraminal stenosis evident.   IMPRESSION:  MRI of the cervical spine demonstrates:  Reversal of the normal cervical curvature. The levels of greatest abnormality are C4-5 through C6-7.  At C4-5 grade 1 anterolisthesis, mild central canal stenosis and severe foraminal stenosis. Correlate for right greater than left C5 radiculopathy.  At C5-6 central disc protrusion/osteophyte with cord flattening. Moderate central canal stenosis, severe foraminal stenosis. Correlate for bilateral C6 radiculopathy.  At C6-7 retrolisthesis and a central disc protrusion/osteophyte flattening the cord. Mild/moderate central canal stenosis and severe bilateral foraminal stenosis. Correlate for bilateral C7 radiculopathy.

## 2024-08-31 NOTE — PHYSICAL EXAM
[Normal muscle bulk without asymmetry] : normal muscle bulk without asymmetry [Normal Spine curvature] : normal spine curvature [Spinous Process Tenderness] : spinous process tenderness [Facet Tenderness] : facet tenderness [Paraspinal Tenderness] : paraspinal tenderness [Normal] : Gait: normal [Spurling] : positive Spurling's Test [Michele's Sign] : positive Michele's Sign [SLR] : positive straight leg raise [LE] : Sensory: Intact in bilateral lower extremities [Bicep] : biceps 2+ and symmetric bilaterally [B.R.] : Brachioradialis 2+ and symmetric bilaterally [Ataxic] : not ataxic [Antalgic] : not antalgic [de-identified] : stye in right lower eyelid  [de-identified] : cervical spine ROM limited on lateral rotation   [de-identified] : slightly forward flexed posture with walking

## 2024-09-03 ENCOUNTER — APPOINTMENT (OUTPATIENT)
Dept: PAIN MANAGEMENT | Facility: CLINIC | Age: 84
End: 2024-09-03

## 2024-09-09 ENCOUNTER — APPOINTMENT (OUTPATIENT)
Age: 84
End: 2024-09-09

## 2024-09-09 PROCEDURE — 64555 IMPLANT NEUROELECTRODES: CPT

## 2024-09-12 ENCOUNTER — APPOINTMENT (OUTPATIENT)
Dept: PAIN MANAGEMENT | Facility: CLINIC | Age: 84
End: 2024-09-12

## 2024-09-12 VITALS
BODY MASS INDEX: 20.09 KG/M2 | DIASTOLIC BLOOD PRESSURE: 75 MMHG | HEART RATE: 76 BPM | OXYGEN SATURATION: 100 % | HEIGHT: 66 IN | SYSTOLIC BLOOD PRESSURE: 149 MMHG | WEIGHT: 125 LBS

## 2024-09-12 PROCEDURE — 62321 NJX INTERLAMINAR CRV/THRC: CPT | Mod: 79

## 2024-09-12 NOTE — PROCEDURE
[FreeTextEntry1] : Cervical ROXANN with fluoroscopy C7- T1    The potential benefits as well as rare but possible risks were reviewed with the patient.  These risks including infection including epidural abscess, meningitis, osteomyelitis, and discitis, bleeding including epidural hematoma, nerve injury, paralysis, failure to relieve pain or worse pain, headache, pneumothorax, elevated blood sugars, allergic reactions, adverse reactions to medications, vasovagal reactions, falls, etc. Following that discussion, all questions were again answered to the patients satisfaction, the patient stated their verbal understanding and written consent was obtained.    After obtaining consent, pre-procedure blood pressure and pulse were recorded and are in the nursing record for review. The patient was placed in a prone position. The respective lumbosacral area was prepped with chloroprep and draped in sterile fashion.   The skin was anesthetized with 1% lidocaine. A 20G Tuohy needle, was used to access the target cervical epidural space using anatomic landmarks, x-ray guidance AP/contralateral oblique views, and loss of resistance. Confirmation of epidural placement was performed with injection of 1cc of omnipaque. There was no evidence of heme or CSF with needle placement. Aspiration was negative for CSF and heme.  At this point, 3 cc of preservative free normal saline and 10 mg dexamethasone was given . The needle was removed, skin cleansed and a sterile bandage was applied. The patient tolerated the procedure well and no complications were encountered. Following the procedure the patient's vital signs were stable. The patient was discharged home in good condition with post-procedural instructions.   Time Out: Immediately prior to the procedure, the following was verbally confirmed that there is a consent form and that the correct patient, planned procedure, site and side are consistent with documentation and that necessary equipment are available prior to the start of the case.   Complications: none EBL: <5 cc. No steroid wasted

## 2024-09-12 NOTE — ASSESSMENT
[FreeTextEntry1] : S/p Cervical interlaminar epidural steroid injection  Plan - Follow up in 2 weeks

## 2024-09-18 ENCOUNTER — NON-APPOINTMENT (OUTPATIENT)
Age: 84
End: 2024-09-18

## 2024-10-08 ENCOUNTER — APPOINTMENT (OUTPATIENT)
Dept: PAIN MANAGEMENT | Facility: CLINIC | Age: 84
End: 2024-10-08

## 2024-10-08 VITALS
WEIGHT: 125 LBS | HEIGHT: 66 IN | SYSTOLIC BLOOD PRESSURE: 150 MMHG | DIASTOLIC BLOOD PRESSURE: 83 MMHG | BODY MASS INDEX: 20.09 KG/M2 | OXYGEN SATURATION: 99 % | HEART RATE: 80 BPM

## 2024-10-08 PROCEDURE — 77002 NEEDLE LOCALIZATION BY XRAY: CPT

## 2024-10-08 PROCEDURE — 20610 DRAIN/INJ JOINT/BURSA W/O US: CPT | Mod: RT

## 2024-10-08 PROCEDURE — 99215 OFFICE O/P EST HI 40 MIN: CPT | Mod: 25

## 2024-10-09 ENCOUNTER — NON-APPOINTMENT (OUTPATIENT)
Age: 84
End: 2024-10-09

## 2024-10-31 ENCOUNTER — APPOINTMENT (OUTPATIENT)
Dept: PAIN MANAGEMENT | Facility: CLINIC | Age: 84
End: 2024-10-31

## 2024-10-31 VITALS
OXYGEN SATURATION: 99 % | SYSTOLIC BLOOD PRESSURE: 108 MMHG | WEIGHT: 123 LBS | DIASTOLIC BLOOD PRESSURE: 58 MMHG | HEIGHT: 66 IN | HEART RATE: 89 BPM | BODY MASS INDEX: 19.77 KG/M2

## 2024-10-31 PROCEDURE — 99215 OFFICE O/P EST HI 40 MIN: CPT | Mod: 25

## 2024-10-31 PROCEDURE — 62325 NJX INTERLAMINAR CRV/THRC: CPT

## 2024-10-31 RX ORDER — CELECOXIB 100 MG/1
100 CAPSULE ORAL TWICE DAILY
Qty: 60 | Refills: 0 | Status: ACTIVE | COMMUNITY
Start: 2024-10-31 | End: 1900-01-01

## 2024-11-01 ENCOUNTER — NON-APPOINTMENT (OUTPATIENT)
Age: 84
End: 2024-11-01

## 2025-04-22 ENCOUNTER — APPOINTMENT (OUTPATIENT)
Dept: PAIN MANAGEMENT | Facility: CLINIC | Age: 85
End: 2025-04-22
Payer: MEDICARE

## 2025-04-22 VITALS
DIASTOLIC BLOOD PRESSURE: 77 MMHG | HEART RATE: 85 BPM | SYSTOLIC BLOOD PRESSURE: 148 MMHG | HEIGHT: 66 IN | WEIGHT: 125 LBS | BODY MASS INDEX: 20.09 KG/M2 | OXYGEN SATURATION: 96 %

## 2025-04-22 PROCEDURE — 99215 OFFICE O/P EST HI 40 MIN: CPT | Mod: 25

## 2025-04-22 PROCEDURE — 62321 NJX INTERLAMINAR CRV/THRC: CPT

## 2025-04-23 ENCOUNTER — NON-APPOINTMENT (OUTPATIENT)
Age: 85
End: 2025-04-23

## 2025-05-01 ENCOUNTER — APPOINTMENT (OUTPATIENT)
Dept: PAIN MANAGEMENT | Facility: CLINIC | Age: 85
End: 2025-05-01
Payer: MEDICARE

## 2025-05-01 VITALS
DIASTOLIC BLOOD PRESSURE: 75 MMHG | HEIGHT: 66 IN | HEART RATE: 71 BPM | WEIGHT: 125 LBS | OXYGEN SATURATION: 97 % | SYSTOLIC BLOOD PRESSURE: 150 MMHG | BODY MASS INDEX: 20.09 KG/M2

## 2025-05-01 DIAGNOSIS — M54.12 SPINAL STENOSIS, CERVICAL REGION: ICD-10-CM

## 2025-05-01 DIAGNOSIS — M25.511 PAIN IN RIGHT SHOULDER: ICD-10-CM

## 2025-05-01 DIAGNOSIS — M48.061 SPINAL STENOSIS, LUMBAR REGION WITHOUT NEUROGENIC CLAUDICATION: ICD-10-CM

## 2025-05-01 DIAGNOSIS — M48.02 SPINAL STENOSIS, CERVICAL REGION: ICD-10-CM

## 2025-05-01 DIAGNOSIS — M41.50 OTHER SECONDARY SCOLIOSIS, SITE UNSPECIFIED: ICD-10-CM

## 2025-05-01 PROCEDURE — 99215 OFFICE O/P EST HI 40 MIN: CPT

## 2025-05-19 ENCOUNTER — APPOINTMENT (OUTPATIENT)
Dept: ORTHOPEDIC SURGERY | Facility: CLINIC | Age: 85
End: 2025-05-19
Payer: MEDICARE

## 2025-05-19 DIAGNOSIS — M48.02 SPINAL STENOSIS, CERVICAL REGION: ICD-10-CM

## 2025-05-19 DIAGNOSIS — M54.12 SPINAL STENOSIS, CERVICAL REGION: ICD-10-CM

## 2025-05-19 DIAGNOSIS — M25.511 PAIN IN RIGHT SHOULDER: ICD-10-CM

## 2025-05-19 PROCEDURE — 72083 X-RAY EXAM ENTIRE SPI 4/5 VW: CPT

## 2025-05-19 PROCEDURE — 99214 OFFICE O/P EST MOD 30 MIN: CPT

## 2025-05-20 ENCOUNTER — APPOINTMENT (OUTPATIENT)
Dept: PAIN MANAGEMENT | Facility: CLINIC | Age: 85
End: 2025-05-20

## 2025-05-30 ENCOUNTER — APPOINTMENT (OUTPATIENT)
Dept: PAIN MANAGEMENT | Facility: CLINIC | Age: 85
End: 2025-05-30

## 2025-05-30 PROCEDURE — 20610 DRAIN/INJ JOINT/BURSA W/O US: CPT

## 2025-05-30 PROCEDURE — 77002 NEEDLE LOCALIZATION BY XRAY: CPT

## 2025-06-13 ENCOUNTER — APPOINTMENT (OUTPATIENT)
Dept: ORTHOPEDIC SURGERY | Facility: CLINIC | Age: 85
End: 2025-06-13
Payer: MEDICARE

## 2025-06-13 VITALS — BODY MASS INDEX: 20.99 KG/M2 | HEIGHT: 65 IN | WEIGHT: 126 LBS

## 2025-06-13 PROBLEM — Z86.79 HISTORY OF HYPERTENSION: Status: RESOLVED | Noted: 2025-06-13 | Resolved: 2025-06-13

## 2025-06-13 PROBLEM — Z78.9 NO PERTINENT FAMILY HISTORY: Status: ACTIVE | Noted: 2025-06-13

## 2025-06-13 PROBLEM — Z87.39 HISTORY OF SPINAL STENOSIS: Status: RESOLVED | Noted: 2025-06-13 | Resolved: 2025-06-13

## 2025-06-13 PROBLEM — Z78.9 CONSUMES ALCOHOL: Status: ACTIVE | Noted: 2025-06-13

## 2025-06-13 PROBLEM — Z85.828 HISTORY OF BASAL CELL CARCINOMA (BCC): Status: RESOLVED | Noted: 2025-06-13 | Resolved: 2025-06-13

## 2025-06-13 PROBLEM — Z78.9 EXERCISES 5 TO 6 TIMES PER WEEK: Status: ACTIVE | Noted: 2025-06-13

## 2025-06-13 PROBLEM — Z78.9 NON-SMOKER: Status: ACTIVE | Noted: 2025-06-13

## 2025-06-13 PROCEDURE — 73610 X-RAY EXAM OF ANKLE: CPT | Mod: LT

## 2025-06-13 PROCEDURE — 71100 X-RAY EXAM RIBS UNI 2 VIEWS: CPT

## 2025-06-13 PROCEDURE — 99213 OFFICE O/P EST LOW 20 MIN: CPT

## 2025-06-27 ENCOUNTER — APPOINTMENT (OUTPATIENT)
Dept: PAIN MANAGEMENT | Facility: CLINIC | Age: 85
End: 2025-06-27

## 2025-06-27 VITALS
BODY MASS INDEX: 20.99 KG/M2 | HEIGHT: 65 IN | HEART RATE: 68 BPM | OXYGEN SATURATION: 99 % | SYSTOLIC BLOOD PRESSURE: 135 MMHG | DIASTOLIC BLOOD PRESSURE: 66 MMHG | WEIGHT: 126 LBS

## 2025-06-27 PROBLEM — M54.6 THORACIC SPINE PAIN: Status: ACTIVE | Noted: 2025-06-27

## 2025-06-27 PROCEDURE — 99215 OFFICE O/P EST HI 40 MIN: CPT

## 2025-07-10 ENCOUNTER — APPOINTMENT (OUTPATIENT)
Dept: PAIN MANAGEMENT | Facility: CLINIC | Age: 85
End: 2025-07-10

## 2025-07-10 VITALS
OXYGEN SATURATION: 98 % | HEIGHT: 65 IN | HEART RATE: 68 BPM | DIASTOLIC BLOOD PRESSURE: 69 MMHG | BODY MASS INDEX: 20.99 KG/M2 | SYSTOLIC BLOOD PRESSURE: 138 MMHG | WEIGHT: 126 LBS

## 2025-07-10 PROCEDURE — 99215 OFFICE O/P EST HI 40 MIN: CPT

## 2025-07-23 ENCOUNTER — APPOINTMENT (OUTPATIENT)
Dept: PAIN MANAGEMENT | Facility: CLINIC | Age: 85
End: 2025-07-23

## 2025-07-23 VITALS
WEIGHT: 126 LBS | SYSTOLIC BLOOD PRESSURE: 128 MMHG | HEIGHT: 65 IN | OXYGEN SATURATION: 98 % | BODY MASS INDEX: 20.99 KG/M2 | DIASTOLIC BLOOD PRESSURE: 71 MMHG | HEART RATE: 81 BPM

## 2025-07-23 PROCEDURE — 64491 INJ PARAVERT F JNT C/T 2 LEV: CPT | Mod: 50

## 2025-07-23 PROCEDURE — 64490 INJ PARAVERT F JNT C/T 1 LEV: CPT | Mod: 50

## 2025-07-25 ENCOUNTER — APPOINTMENT (OUTPATIENT)
Dept: ORTHOPEDIC SURGERY | Facility: CLINIC | Age: 85
End: 2025-07-25
Payer: MEDICARE

## 2025-07-25 DIAGNOSIS — M94.0 CHONDROCOSTAL JUNCTION SYNDROME [TIETZE]: ICD-10-CM

## 2025-07-25 DIAGNOSIS — S92.252S: ICD-10-CM

## 2025-07-25 PROCEDURE — 99213 OFFICE O/P EST LOW 20 MIN: CPT

## 2025-08-19 ENCOUNTER — APPOINTMENT (OUTPATIENT)
Dept: PAIN MANAGEMENT | Facility: CLINIC | Age: 85
End: 2025-08-19

## 2025-08-19 VITALS
HEIGHT: 65 IN | SYSTOLIC BLOOD PRESSURE: 124 MMHG | HEART RATE: 77 BPM | WEIGHT: 126 LBS | DIASTOLIC BLOOD PRESSURE: 73 MMHG | OXYGEN SATURATION: 96 % | BODY MASS INDEX: 20.99 KG/M2

## 2025-08-19 DIAGNOSIS — M48.061 SPINAL STENOSIS, LUMBAR REGION WITHOUT NEUROGENIC CLAUDICATION: ICD-10-CM

## 2025-08-19 DIAGNOSIS — M54.6 PAIN IN THORACIC SPINE: ICD-10-CM

## 2025-08-19 DIAGNOSIS — M65.30 TRIGGER FINGER, UNSPECIFIED FINGER: ICD-10-CM

## 2025-08-19 PROCEDURE — 99215 OFFICE O/P EST HI 40 MIN: CPT
